# Patient Record
Sex: FEMALE | Race: WHITE | NOT HISPANIC OR LATINO | Employment: UNEMPLOYED | URBAN - METROPOLITAN AREA
[De-identification: names, ages, dates, MRNs, and addresses within clinical notes are randomized per-mention and may not be internally consistent; named-entity substitution may affect disease eponyms.]

---

## 2017-01-18 ENCOUNTER — OFFICE VISIT (OUTPATIENT)
Dept: FAMILY MEDICINE CLINIC | Facility: CLINIC | Age: 62
End: 2017-01-18

## 2017-01-18 VITALS
SYSTOLIC BLOOD PRESSURE: 120 MMHG | DIASTOLIC BLOOD PRESSURE: 70 MMHG | BODY MASS INDEX: 24.23 KG/M2 | HEART RATE: 72 BPM | WEIGHT: 163.6 LBS | TEMPERATURE: 98.7 F | HEIGHT: 69 IN

## 2017-01-18 DIAGNOSIS — E78.2 MIXED HYPERLIPIDEMIA: Primary | ICD-10-CM

## 2017-01-18 DIAGNOSIS — J06.9 URI, ACUTE: ICD-10-CM

## 2017-01-18 DIAGNOSIS — M19.90 ARTHRITIS: ICD-10-CM

## 2017-01-18 PROCEDURE — 99203 OFFICE O/P NEW LOW 30 MIN: CPT | Performed by: PHYSICIAN ASSISTANT

## 2017-01-18 RX ORDER — ATORVASTATIN CALCIUM 20 MG/1
20 TABLET, FILM COATED ORAL DAILY
COMMUNITY
End: 2017-01-18 | Stop reason: SDUPTHER

## 2017-01-18 RX ORDER — MELOXICAM 7.5 MG/1
7.5 TABLET ORAL DAILY
Qty: 30 TABLET | Refills: 5 | Status: SHIPPED | OUTPATIENT
Start: 2017-01-18

## 2017-01-18 RX ORDER — CEPHALEXIN 500 MG/1
500 CAPSULE ORAL 3 TIMES DAILY
Qty: 21 CAPSULE | Refills: 0 | Status: SHIPPED | OUTPATIENT
Start: 2017-01-18

## 2017-01-18 RX ORDER — ATORVASTATIN CALCIUM 20 MG/1
20 TABLET, FILM COATED ORAL DAILY
Qty: 30 TABLET | Refills: 5 | Status: SHIPPED | OUTPATIENT
Start: 2017-01-18

## 2017-01-18 RX ORDER — MELOXICAM 7.5 MG/1
7.5 TABLET ORAL DAILY
COMMUNITY
End: 2017-01-18 | Stop reason: SDUPTHER

## 2017-01-18 NOTE — MR AVS SNAPSHOT
Blanca Hauser   1/18/2017 3:00 PM   Office Visit    Dept Phone:  [phone]   Encounter #:  [id number]    Provider:  Bina Maria PA-C   Department:  [facility]                Your Full Care Plan              Today's Medication Changes          These changes are accurate as of: 1/18/17  3:55 PM.  If you have any questions, ask your nurse or doctor.               New Medication(s)Ordered:     cephalexin 500 MG capsule   Commonly known as:  KEFLEX   Take 1 capsule by mouth 3 (Three) Times a Day.   Started by:  Bina Maria PA-C            Where to Get Your Medications      These medications were sent to [facility]/pharmacy #[id number] - [place], [place] - [address] [phone]  - [phone]   [address] [address]    Hours:  24-hours Phone:  [phone]     atorvastatin 20 MG tablet    cephalexin 500 MG capsule    meloxicam 7.5 MG tablet                  Your Updated Medication List          This list is accurate as of: 1/18/17  3:55 PM.  Always use your most recent med list.                atorvastatin 20 MG tablet   Commonly known as:  LIPITOR   Take 1 tablet by mouth Daily.       cephalexin 500 MG capsule   Commonly known as:  KEFLEX   Take 1 capsule by mouth 3 (Three) Times a Day.       meloxicam 7.5 MG tablet   Commonly known as:  MOBIC   Take 1 tablet by mouth Daily.               You Were Diagnosed With        Codes Comments    Mixed hyperlipidemia    -  Primary ICD-10-CM: E78.2  ICD-9-CM: 272.2     Arthritis     ICD-10-CM: M19.90  ICD-9-CM: 716.90     URI, acute     ICD-10-CM: J06.9  ICD-9-CM: 465.9       Instructions     None    Patient Instructions History      Upcoming Appointments     Visit Type Date Time Department    NEW PATIENT 1/18/2017  3:00 PM [facility]      MyChart Signup     [facility] allows you to send messages to your doctor, view your test results, renew your prescriptions,  "schedule appointments, and more. To sign up, go to [email or web address] and click on the Sign Up Now link in the New User? box. Enter your [email or web address] Activation Code exactly as it appears below along with the last four digits of your Social Security Number and your Date of Birth () to complete the sign-up process. If you do not sign up before the expiration date, you must request a new code.    [email or web address] Activation Code: [email or web address]  Expires: 2017  3:55 PM    If you have questions, you can email [email or web address] or call [phone] to talk to our [email or web address] staff. Remember, [email or web address] is NOT to be used for urgent needs. For medical emergencies, dial 911.               Other Info from Your Visit           Allergies     Doxycycline  GI Intolerance      Reason for Visit     Establish Care     Cough     Med Refill mobic and lipitor      Vital Signs     Blood Pressure Pulse Temperature Height Weight Body Mass Index    120/70 (BP Location: Left arm, Patient Position: Sitting, Cuff Size: Adult) 72 98.7 °F (37.1 °C) (Oral) 69\" (175.3 cm) 163 lb 9.6 oz (74.2 kg) 24.16 kg/m2    Smoking Status                   Current Every Day Smoker           Problems and Diagnoses Noted     Mixed hyperlipidemia    -  Primary    Arthritis        URI, acute            "

## 2017-01-18 NOTE — PROGRESS NOTES
Subjective   Blanca Hauser is a 61 y.o. female    History of Present Illness  Patient presents today as a new patient to our practice.  She comes in today for evaluation of 2 chronic medical problems and one new medical problem.  She's been diagnosed with hyperlipidemia and is been on Lipitor greater than 10 years.  She needs refills of this medication.  She states she had her labs checked prior to 6 months ago and they were stable.  Additionally she needs refills on meloxicam she takes daily for arthritis in her hips and hands as well as shoulders and neck.  She feels that her arthritis pain is reduced by 80% on this medication and she is satisfied with her pain control.  Additionally patient's been experiencing a cough with a fever for the past 5 days.  She feels her cough is improving.  It is dry.  She denies he first breath and wheezing.  She is a one pack per day smoker.  She denies any history of COPD.  The following portions of the patient's history were reviewed and updated as appropriate: allergies, current medications, past social history and problem list    Review of Systems   Constitutional: Positive for fever. Negative for activity change, fatigue and unexpected weight change.   HENT: Positive for postnasal drip, rhinorrhea, sore throat and voice change. Negative for sinus pressure and sneezing.    Respiratory: Positive for cough and wheezing. Negative for chest tightness and shortness of breath.    Cardiovascular: Negative for chest pain, palpitations and leg swelling.   Gastrointestinal: Negative for nausea.   Musculoskeletal: Positive for arthralgias and myalgias. Negative for gait problem, joint swelling, neck pain and neck stiffness.   Skin: Negative.  Negative for color change and rash.   Neurological: Negative for dizziness, tremors, syncope, weakness, numbness and headaches.       Objective     Vitals:    01/18/17 1530   BP: 120/70   Pulse: 72   Temp: 98.7 °F (37.1 °C)       Physical Exam    Constitutional: She appears well-developed and well-nourished.   HENT:   Head: Normocephalic and atraumatic.   Nose: Nose normal.   Mouth/Throat: Oropharynx is clear and moist.   Neck: Neck supple. No JVD present.   Cardiovascular: Normal rate, regular rhythm, normal heart sounds, intact distal pulses and normal pulses.    No murmur heard.  Pulmonary/Chest: Effort normal and breath sounds normal. No stridor. No respiratory distress.   Abdominal: Soft. Bowel sounds are normal. There is no hepatosplenomegaly. There is no tenderness.   Musculoskeletal: She exhibits no edema, tenderness or deformity.   Gait normal     Lymphadenopathy:     She has no cervical adenopathy.   Neurological: She exhibits normal muscle tone. Coordination normal.   Skin: Skin is warm and dry. No rash noted. No erythema. No pallor.   Nursing note and vitals reviewed.      Assessment/Plan     Diagnoses and all orders for this visit:    Mixed hyperlipidemia  -     atorvastatin (LIPITOR) 20 MG tablet; Take 1 tablet by mouth Daily.    Arthritis  -     meloxicam (MOBIC) 7.5 MG tablet; Take 1 tablet by mouth Daily.    URI, acute  -     cephalexin (KEFLEX) 500 MG capsule; Take 1 capsule by mouth 3 (Three) Times a Day.    Other orders  -     Discontinue: meloxicam (MOBIC) 7.5 MG tablet; Take 7.5 mg by mouth Daily.  -     Discontinue: atorvastatin (LIPITOR) 20 MG tablet; Take 20 mg by mouth Daily.     follow-up in 6 months for recheck.  Strongly encouraged smoking cessation